# Patient Record
Sex: FEMALE | Race: WHITE
[De-identification: names, ages, dates, MRNs, and addresses within clinical notes are randomized per-mention and may not be internally consistent; named-entity substitution may affect disease eponyms.]

---

## 2018-12-12 ENCOUNTER — HOSPITAL ENCOUNTER (EMERGENCY)
Dept: HOSPITAL 25 - UCEAST | Age: 63
Discharge: HOME | End: 2018-12-12
Payer: COMMERCIAL

## 2018-12-12 VITALS — DIASTOLIC BLOOD PRESSURE: 77 MMHG | SYSTOLIC BLOOD PRESSURE: 145 MMHG

## 2018-12-12 DIAGNOSIS — Z88.2: ICD-10-CM
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
## 2018-12-12 DIAGNOSIS — S80.01XA: Primary | ICD-10-CM
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
## 2018-12-12 DIAGNOSIS — Z88.0: ICD-10-CM

## 2018-12-12 PROCEDURE — G0463 HOSPITAL OUTPT CLINIC VISIT: HCPCS

## 2018-12-12 PROCEDURE — 99203 OFFICE O/P NEW LOW 30 MIN: CPT
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
## 2020-12-16 ENCOUNTER — APPOINTMENT (RX ONLY)
Dept: URBAN - METROPOLITAN AREA CLINIC 152 | Facility: CLINIC | Age: 65
Setting detail: DERMATOLOGY
End: 2020-12-16

## 2020-12-16 DIAGNOSIS — L28.1 PRURIGO NODULARIS: ICD-10-CM | Status: INADEQUATELY CONTROLLED

## 2020-12-16 PROCEDURE — ? PRESCRIPTION

## 2020-12-16 PROCEDURE — ? PRESCRIPTION MEDICATION MANAGEMENT

## 2020-12-16 PROCEDURE — ? CONSENT FOR TELEMEDICINE VISIT OBTAINED

## 2020-12-16 PROCEDURE — ? DIAGNOSIS COMMENT

## 2020-12-16 PROCEDURE — ? COUNSELING

## 2020-12-16 PROCEDURE — 99203 OFFICE O/P NEW LOW 30 MIN: CPT | Mod: 95

## 2020-12-16 RX ORDER — FLUOCINONIDE 0.5 MG/G
OINTMENT TOPICAL QD
Qty: 1 | Refills: 3 | Status: ERX | COMMUNITY
Start: 2020-12-16

## 2020-12-16 RX ADMIN — FLUOCINONIDE: 0.5 OINTMENT TOPICAL at 00:00

## 2020-12-16 ASSESSMENT — LOCATION DETAILED DESCRIPTION DERM
LOCATION DETAILED: LEFT PROXIMAL DORSAL FOREARM
LOCATION DETAILED: RIGHT DISTAL PRETIBIAL REGION
LOCATION DETAILED: LEFT DISTAL DORSAL FOREARM
LOCATION DETAILED: RIGHT PROXIMAL PRETIBIAL REGION

## 2020-12-16 ASSESSMENT — LOCATION SIMPLE DESCRIPTION DERM
LOCATION SIMPLE: LEFT FOREARM
LOCATION SIMPLE: RIGHT PRETIBIAL REGION

## 2020-12-16 ASSESSMENT — LOCATION ZONE DERM
LOCATION ZONE: ARM
LOCATION ZONE: LEG

## 2020-12-16 NOTE — PROCEDURE: PRESCRIPTION MEDICATION MANAGEMENT
Detail Level: Zone
Render In Strict Bullet Format?: No
Initiate Treatment: Fluocinonide 0.05% ointment BID

## 2020-12-16 NOTE — PROCEDURE: MIPS QUALITY
Quality 397: Melanoma: Reporting: Melanoma not of cutaneous origin (not eligible for melanoma reporting measure 397)
Quality 137: Melanoma: Continuity Of Care - Recall System: Recall system not utilized, reason not otherwise specified
Quality 110: Preventive Care And Screening: Influenza Immunization: Influenza Immunization Administered during Influenza season
Quality 226: Preventive Care And Screening: Tobacco Use: Screening And Cessation Intervention: Patient screened for tobacco use and is an ex/non-smoker
Quality 138: Melanoma: Coordination Of Care: Treatment plan not communicated, reason not otherwise specified.
Quality 47: Advance Care Plan: Advance care planning not documented, reason not otherwise specified.
Detail Level: Detailed
Quality 431: Preventive Care And Screening: Unhealthy Alcohol Use - Screening: Patient screened for unhealthy alcohol use using a single question and scores less than 2 times per year
Quality 130: Documentation Of Current Medications In The Medical Record: Current Medications Documented
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
## 2020-12-16 NOTE — PROCEDURE: DIAGNOSIS COMMENT
Detail Level: Simple
Comment: Discussed most likely diagnosis. Recommended treating with Fluocinonide 0.05% ointment BID for 1 month. Follow up in 1 month for biopsy if active. Recommended Coban wraps to help avoid scratching.